# Patient Record
Sex: FEMALE | URBAN - METROPOLITAN AREA
[De-identification: names, ages, dates, MRNs, and addresses within clinical notes are randomized per-mention and may not be internally consistent; named-entity substitution may affect disease eponyms.]

---

## 2018-02-05 ENCOUNTER — OFFICE VISIT (OUTPATIENT)
Dept: INTERNAL MEDICINE CLINIC | Age: 33
End: 2018-02-05

## 2018-02-05 VITALS
SYSTOLIC BLOOD PRESSURE: 101 MMHG | WEIGHT: 142 LBS | HEART RATE: 66 BPM | DIASTOLIC BLOOD PRESSURE: 63 MMHG | TEMPERATURE: 97.9 F | OXYGEN SATURATION: 99 % | HEIGHT: 65 IN | RESPIRATION RATE: 16 BRPM | BODY MASS INDEX: 23.66 KG/M2

## 2018-02-05 DIAGNOSIS — H81.10 BENIGN PAROXYSMAL POSITIONAL VERTIGO, UNSPECIFIED LATERALITY: Primary | ICD-10-CM

## 2018-02-05 RX ORDER — ONDANSETRON 8 MG/1
8 TABLET, ORALLY DISINTEGRATING ORAL
Qty: 20 TAB | Refills: 0 | Status: SHIPPED | OUTPATIENT
Start: 2018-02-05 | End: 2018-11-12 | Stop reason: ALTCHOICE

## 2018-02-05 RX ORDER — MECLIZINE HYDROCHLORIDE 25 MG/1
25 TABLET ORAL
Qty: 30 TAB | Refills: 0 | Status: SHIPPED | OUTPATIENT
Start: 2018-02-05 | End: 2018-02-15

## 2018-02-05 NOTE — PROGRESS NOTES
Chief Complaint   Patient presents with    Vomiting     all this AM- 4 or five times     Nausea     pt is 27 wks- Dr. Naty Leslie VPFW     1. Have you been to the ER, urgent care clinic since your last visit? Hospitalized since your last visit? No    2. Have you seen or consulted any other health care providers outside of the [address] [place] [address] since your last visit? Include any pap smears or colon screening.  No

## 2018-02-05 NOTE — PATIENT INSTRUCTIONS
Vertigo: Care Instructions  Your Care Instructions    Vertigo is the feeling that you or your surroundings are moving when there is no actual movement. It is often described as a feeling of spinning, whirling, falling, or tilting. Vertigo may make you vomit or feel nauseated. You may have trouble standing or walking and may lose your balance. Vertigo is often related to an inner ear problem, but it can have other more serious causes. If vertigo continues, you may need more tests to find its cause. Follow-up care is a key part of your treatment and safety. Be sure to make and go to all appointments, and call your doctor if you are having problems. It's also a good idea to know your test results and keep a list of the medicines you take. How can you care for yourself at home? · Do not lie flat on your back. Prop yourself up slightly. This may reduce the spinning feeling. Keep your eyes open. · Move slowly so that you do not fall. · If your doctor recommends medicine, take it exactly as directed. · Do not drive while you are having vertigo. Certain exercises, called Tucker-Daroff exercises, can help decrease vertigo. To do Tucker-Daroff exercises:  · Sit on the edge of a bed or sofa and quickly lie down on the side that causes the worst vertigo. Lie on your side with your ear down. · Stay in this position for at least 30 seconds or until the vertigo goes away. · Sit up. If this causes vertigo, wait for it to stop. · Repeat the procedure on the other side. · Repeat this 10 times. Do these exercises 2 times a day until the vertigo is gone. When should you call for help? Call 911 anytime you think you may need emergency care. For example, call if:  ? · You passed out (lost consciousness). ? · You have symptoms of a stroke. These may include:  ¨ Sudden numbness, tingling, weakness, or loss of movement in your face, arm, or leg, especially on only one side of your body.   ¨ Sudden vision changes. ¨ Sudden trouble speaking. ¨ Sudden confusion or trouble understanding simple statements. ¨ Sudden problems with walking or balance. ¨ A sudden, severe headache that is different from past headaches. ?Call your doctor now or seek immediate medical care if:  ? · Vertigo occurs with a fever, a headache, or ringing in your ears. ? · You have new or increased nausea and vomiting. ? Watch closely for changes in your health, and be sure to contact your doctor if:  ? · Vertigo gets worse or happens more often. ? · Vertigo has not gotten better after 2 weeks. Where can you learn more? Go to http://jeremy-heriberto.info/. Enter V229 in the search box to learn more about \"Vertigo: Care Instructions. \"  Current as of: May 12, 2017  Content Version: 11.4  © 7366-7876 [employer]. Care instructions adapted under license by [employer] (which disclaims liability or warranty for this information). If you have questions about a medical condition or this instruction, always ask your healthcare professional. [place] [address] any warranty or liability for your use of this information. Vertigo: Exercises  Your Care Instructions  Here are some examples of typical rehabilitation exercises for your condition. Start each exercise slowly. Ease off the exercise if you start to have pain. Your doctor or physical therapist will tell you when you can start these exercises and which ones will work best for you. How to do the exercises  Exercise 1    1. Stand with a chair in front of you and a wall behind you. If you begin to fall, you may use them for support. 2. Stand with your feet together and your arms at your sides. 3. Move your head up and down 10 times. Exercise 2    1. Move your head side to side 10 times. Exercise 3    1. Move your head diagonally up and down 10 times. Exercise 4    1.  Move your head diagonally up and down 10 times on the other side.  Follow-up care is a key part of your treatment and safety. Be sure to make and go to all appointments, and call your doctor if you are having problems. It's also a good idea to know your test results and keep a list of the medicines you take. Where can you learn more? Go to http://jeremy-heriberto.info/. Enter F349 in the search box to learn more about \"Vertigo: Exercises. \"  Current as of: May 4, 2017  Content Version: 11.4  © 6868-5799 [employer]. Care instructions adapted under license by [employer] (which disclaims liability or warranty for this information). If you have questions about a medical condition or this instruction, always ask your healthcare professional. [address] any warranty or liability for your use of this information. Meclizine up to 3 times a day for dizziness (may need to purchase over the counter). Ondansetron for vomiting.

## 2018-02-05 NOTE — PROGRESS NOTES
HISTORY OF PRESENT ILLNESS  Cassie Gaston is a 34 y.o. female. HPI  YESTERDAY HAD allergy attack when sneezed all am long. This am nose was congested but otherwise feeling ok. Went to work and started to feel dizzy. Vomitted 4-5 times this am.  No diarrhea or abdominal cramping. Had episode of vertigo which was similar several years ago. Dizzy when opens eyes and moves. Hearing is normal.  No fevers or chills. 27 weeks pregnant. In office bs was slightly elevated. Completed 3 hours GTT on Friday, she does not know the results. Current Outpatient Prescriptions:     multivitamin (ONE A DAY) tablet, Take 1 Tab by mouth daily. prenatal, Disp: , Rfl:     Visit Vitals    /63 (BP 1 Location: Left arm, BP Patient Position: Sitting)    Pulse 66    Temp 97.9 °F (36.6 °C) (Oral)    Resp 16    Ht 5' 4.5\" (1.638 m)    Wt 142 lb (64.4 kg)    SpO2 99%    BMI 24 kg/m2       ROS  See above  Physical Exam   Constitutional: She appears well-developed and well-nourished. HENT:   Head: Normocephalic and atraumatic. Right Ear: External ear normal.   Left Ear: External ear normal.   Nose: Nose normal.   Mouth/Throat: Oropharynx is clear and moist.   Neck: Neck supple. No thyromegaly present. Cardiovascular: Normal rate, regular rhythm and normal heart sounds. Exam reveals no gallop and no friction rub. No murmur heard. Pulmonary/Chest: Effort normal and breath sounds normal.   Lymphadenopathy:     She has no cervical adenopathy. Vitals reviewed. ASSESSMENT and PLAN  Vertigo - discussed treatment. Meclizine prn.  odansetron prn. Vertigo exercises. Orders Placed This Encounter    ondansetron (ZOFRAN ODT) 8 mg disintegrating tablet    meclizine (ANTIVERT) 25 mg tablet     Follow-up Disposition:  Return if symptoms worsen or fail to improve.

## 2018-02-05 NOTE — MR AVS SNAPSHOT
[address] [place] [address] [address] [place] [address] 
[phone] Patient: Felicia Sifuentes MRN: [id number] KLP:6/36/1949 Visit Information Date & Time Provider Department Dept. Phone Encounter #  
 2/5/2018 12:20 Lynn Feliciano MD [facility] [phone] [id number] Follow-up Instructions Return if symptoms worsen or fail to improve. Upcoming Health Maintenance Date Due DTaP/Tdap/Td series (1 - Tdap) 4/14/2005 PAP AKA CERVICAL CYTOLOGY 4/14/2005 Influenza Age 5 to Adult 8/1/2017 Allergies as of 2/5/2018  Review Complete On: 2/5/2018 By: Kee Muir MD  
 No Known Allergies Current Immunizations  Reviewed on 10/17/2016 Name Date Influenza Vaccine 9/24/2016 Not reviewed this visit You Were Diagnosed With   
  
 Codes Comments Benign paroxysmal positional vertigo, unspecified laterality    -  Primary ICD-10-CM: H81.10 ICD-9-CM: 386.11 Vitals BP Pulse Temp Resp Height(growth percentile) Weight(growth percentile) 101/63 (BP 1 Location: Left arm, BP Patient Position: Sitting) 66 97.9 °F (36.6 °C) (Oral) 16 5' 4.5\" (1.638 m) 142 lb (64.4 kg) SpO2 BMI OB Status Smoking Status 99% 24 kg/m2 Pregnant Never Smoker BMI and BSA Data Body Mass Index Body Surface Area  
 24 kg/m 2 1.71 m 2 Preferred Pharmacy Pharmacy Name Phone [id number] [address] [phone] Your Updated Medication List  
  
   
This list is accurate as of: 2/5/18  1:22 PM.  Always use your most recent med list.  
  
  
  
  
 meclizine 25 mg tablet Commonly known as:  ANTIVERT Take 1 Tab by mouth three (3) times daily as needed for up to 10 days. For vertigo  
  
 multivitamin tablet Commonly known as:  ONE A DAY Take 1 Tab by mouth daily. prenatal  
  
 ondansetron 8 mg disintegrating tablet Commonly known as:  ZOFRAN ODT Take 1 Tab by mouth every eight (8) hours as needed for Nausea. And vomiting Prescriptions Sent to Pharmacy Refills  
 ondansetron (ZOFRAN ODT) 8 mg disintegrating tablet 0 Sig: Take 1 Tab by mouth every eight (8) hours as needed for Nausea. And vomiting Class: Normal  
 Pharmacy: [facility] #[id number] Ph #: [phone] Route: Oral  
 meclizine (ANTIVERT) 25 mg tablet 0 Sig: Take 1 Tab by mouth three (3) times daily as needed for up to 10 days. For vertigo Class: Normal  
 Pharmacy: [facility]/pharmacy #[id number] Ph #: [phone] Route: Oral  
  
Follow-up Instructions Return if symptoms worsen or fail to improve. Patient Instructions Vertigo: Care Instructions Your Care Instructions Vertigo is the feeling that you or your surroundings are moving when there is no actual movement. It is often described as a feeling of spinning, whirling, falling, or tilting. Vertigo may make you vomit or feel nauseated. You may have trouble standing or walking and may lose your balance. Vertigo is often related to an inner ear problem, but it can have other more serious causes. If vertigo continues, you may need more tests to find its cause. Follow-up care is a key part of your treatment and safety. Be sure to make and go to all appointments, and call your doctor if you are having problems. It's also a good idea to know your test results and keep a list of the medicines you take. How can you care for yourself at home? · Do not lie flat on your back. Prop yourself up slightly. This may reduce the spinning feeling. Keep your eyes open. · Move slowly so that you do not fall. · If your doctor recommends medicine, take it exactly as directed. · Do not drive while you are having vertigo.  
Certain exercises, called Tucker-Daroff exercises, can help decrease vertigo. To do Tucker-Daroff exercises: · Sit on the edge of a bed or sofa and quickly lie down on the side that causes the worst vertigo. Lie on your side with your ear down. · Stay in this position for at least 30 seconds or until the vertigo goes away. · Sit up. If this causes vertigo, wait for it to stop. · Repeat the procedure on the other side. · Repeat this 10 times. Do these exercises 2 times a day until the vertigo is gone. When should you call for help? Call 911 anytime you think you may need emergency care. For example, call if: 
? · You passed out (lost consciousness). ? · You have symptoms of a stroke. These may include: 
¨ Sudden numbness, tingling, weakness, or loss of movement in your face, arm, or leg, especially on only one side of your body. ¨ Sudden vision changes. ¨ Sudden trouble speaking. ¨ Sudden confusion or trouble understanding simple statements. ¨ Sudden problems with walking or balance. ¨ A sudden, severe headache that is different from past headaches. ?Call your doctor now or seek immediate medical care if: 
? · Vertigo occurs with a fever, a headache, or ringing in your ears. ? · You have new or increased nausea and vomiting. ? Watch closely for changes in your health, and be sure to contact your doctor if: 
? · Vertigo gets worse or happens more often. ? · Vertigo has not gotten better after 2 weeks. Where can you learn more? Go to http://jeremy-heriberto.info/. Enter K680 in the search box to learn more about \"Vertigo: Care Instructions. \" Current as of: May 12, 2017 Content Version: 11.4 © 8718-8169 [employer]. Care instructions adapted under license by [employer] (which disclaims liability or warranty for this information).  If you have questions about a medical condition or this instruction, always ask your healthcare professional. [address] any warranty or liability for your use of this information. Vertigo: Exercises Your Care Instructions Here are some examples of typical rehabilitation exercises for your condition. Start each exercise slowly. Ease off the exercise if you start to have pain. Your doctor or physical therapist will tell you when you can start these exercises and which ones will work best for you. How to do the exercises Exercise 1 1. Stand with a chair in front of you and a wall behind you. If you begin to fall, you may use them for support. 2. Stand with your feet together and your arms at your sides. 3. Move your head up and down 10 times. Exercise 2 1. Move your head side to side 10 times. Exercise 3 1. Move your head diagonally up and down 10 times. Exercise 4 1. Move your head diagonally up and down 10 times on the other side. Follow-up care is a key part of your treatment and safety. Be sure to make and go to all appointments, and call your doctor if you are having problems. It's also a good idea to know your test results and keep a list of the medicines you take. Where can you learn more? Go to http://jeremy-heriberto.info/. Enter F349 in the search box to learn more about \"Vertigo: Exercises. \" Current as of: May 4, 2017 Content Version: 11.4 © 4361-1167 [employer]. Care instructions adapted under license by [employer] (which disclaims liability or warranty for this information). If you have questions about a medical condition or this instruction, always ask your healthcare professional. [place] [address] any warranty or liability for your use of this information. Meclizine up to 3 times a day for dizziness (may need to purchase over the counter). Ondansetron for vomiting. Introducing [facility]! Dear Donavan Males: Thank you for requesting a [employer] account. Our records indicate that you already have an active [employer] account.   You can access your account anytime at [email or web address] Did you know that you can access your hospital and ER discharge instructions at any time in [employer]? You can also review all of your test results from your hospital stay or ER visit. Additional Information If you have questions, please visit the Frequently Asked Questions section of the [employer] website at [email or web address] Remember, [employer] is NOT to be used for urgent needs. For medical emergencies, dial 911. Now available from your iPhone and Android! Please provide this summary of care documentation to your next provider. Your primary care clinician is listed as RICO LOAIZA. If you have any questions after today's visit, please call [phone].

## 2018-11-12 ENCOUNTER — OFFICE VISIT (OUTPATIENT)
Dept: INTERNAL MEDICINE CLINIC | Age: 33
End: 2018-11-12

## 2018-11-12 VITALS
OXYGEN SATURATION: 99 % | TEMPERATURE: 98.2 F | WEIGHT: 127 LBS | RESPIRATION RATE: 18 BRPM | DIASTOLIC BLOOD PRESSURE: 60 MMHG | BODY MASS INDEX: 21.16 KG/M2 | HEART RATE: 54 BPM | HEIGHT: 65 IN | SYSTOLIC BLOOD PRESSURE: 115 MMHG

## 2018-11-12 DIAGNOSIS — Z00.00 ROUTINE GENERAL MEDICAL EXAMINATION AT A HEALTH CARE FACILITY: Primary | ICD-10-CM

## 2018-11-12 DIAGNOSIS — Z23 ENCOUNTER FOR IMMUNIZATION: ICD-10-CM

## 2018-11-12 NOTE — PROGRESS NOTES
Subjective:  
28 y.o. female for Well Woman Check. Her gyne and breast care is done elsewhere by her Ob-Gyne physician. UTD with Baldwin Schirmer, MD for gyn care in July. There are no active problems to display for this patient. Current Outpatient Medications Medication Sig Dispense Refill  [id number] maranda-folic-dha (PRENATAL DHA+COMPLETE PRENATAL) -300 mg-mcg-mg cmpk Take  by mouth. No Known Allergies History reviewed. No pertinent past medical history. History reviewed. No pertinent surgical history. Family History Problem Relation Age of Onset  Hypertension Mother  Cancer Father   
     Eunice Kwok Social History Tobacco Use  Smoking status: Never Smoker  Smokeless tobacco: Never Used Substance Use Topics  Alcohol use: No  
  
 
 
Specific concerns today: none. 11 month old daughter (first). Continues to breast feed. Induced at 38 weeks. Hasn't lost all of her baby weight but \"I was pretty skinny prior to getting pregnant\" Review of Systems Constitutional: negative Eyes: increased blurred vision since pregnant. wearing glasses more regularly. due to eye exam.   
Ears, nose, mouth, throat, and face: negative Respiratory: negative Cardiovascular: negative Gastrointestinal: negative except for reflux resolved post delivery Genitourinary:negative Integument/breast: negative Hematologic/lymphatic: negative Musculoskeletal:negative except for mild knee stiffness Neurological: negative Behavioral/Psych: negative Endocrine: negative Allergic/Immunologic: negative Objective:  
Blood pressure 115/60, pulse (!) 54, temperature 98.2 °F (36.8 °C), temperature source Oral, resp. rate 18, height 5' 4.5\" (1.638 m), weight 127 lb (57.6 kg), SpO2 99 %, unknown if currently breastfeeding. Physical Examination:  
General appearance - alert, well appearing, and in no distress and oriented to person, place, and time Mental status - alert, oriented to person, place, and time, normal mood, behavior, speech, dress, motor activity, and thought processes Eyes - pupils equal and reactive, extraocular eye movements intact Ears - bilateral TM's and external ear canals normal 
Nose - normal and patent, no erythema, discharge or polyps Mouth - mucous membranes moist, pharynx normal without lesions Neck - supple, no significant adenopathy, carotids upstroke normal bilaterally, no bruits, thyroid exam: thyroid is normal in size without nodules or tenderness Lymphatics - no palpable lymphadenopathy, no hepatosplenomegaly Chest - clear to auscultation, no wheezes, rales or rhonchi, symmetric air entry Heart - normal rate, regular rhythm, normal S1, S2, no murmurs, rubs, clicks or gallops Abdomen - soft, nontender, nondistended, no masses or organomegaly 
bowel sounds normal 
Breasts - breasts appear normal, no suspicious masses, no skin or nipple changes or axillary nodes Back exam - full range of motion, no tenderness, palpable spasm or pain on motion Neurological - alert, oriented, normal speech, no focal findings or movement disorder noted Musculoskeletal - no joint tenderness, deformity or swelling Extremities - peripheral pulses normal, no pedal edema, no clubbing or cyanosis Skin - normal coloration and turgor, no rashes, no suspicious skin lesions noted Assessment/Plan:  
Healthy 34yo Discussed regular exercise No labs indicated at this time Flu shot administered at visit. call if any problems Orders Placed This Encounter  MT IMMUNIZ ADMIN,1 SINGLE/COMB VAC/TOXOID  
 INFLUENZA VIRUS VACCINE QUADRIVALENT, PRESERVATIVE FREE SYRINGE (06566)  [id number] maranda-folic-dha (PRENATAL DHA+COMPLETE PRENATAL) [id number] mg-mcg-mg cmpk Follow-up Disposition: Not on File

## 2018-11-12 NOTE — PROGRESS NOTES
All health maintenance and other pertinent information has been reviewed in preparation for today's office visit. Patient presents in the office today for: Chief Complaint Patient presents with  Complete Physical  
  Not fasting 1. Have you been to the ER, urgent care clinic since your last visit? Hospitalized since your last visit? No 
 
2. Have you seen or consulted any other health care providers outside of the [facility] since your last visit? Include any pap smears or colon screening. No 
 
 
Per provider order,  FLUARIX QUADRIVALENT INFLUENZA VACCINE, IM (0.5) mL was administered to the patient in the left deltoid via IM route. Patient tolerated vaccine/injection well. Patient waited for 20 minutes post injection for observation. No adverse reaction noted. All documentation completed.

## 2019-10-02 ENCOUNTER — OFFICE VISIT (OUTPATIENT)
Dept: INTERNAL MEDICINE CLINIC | Age: 34
End: 2019-10-02

## 2019-10-02 VITALS
HEIGHT: 65 IN | WEIGHT: 122 LBS | OXYGEN SATURATION: 99 % | DIASTOLIC BLOOD PRESSURE: 71 MMHG | BODY MASS INDEX: 20.33 KG/M2 | SYSTOLIC BLOOD PRESSURE: 117 MMHG | HEART RATE: 49 BPM | TEMPERATURE: 98.5 F

## 2019-10-02 DIAGNOSIS — Z23 ENCOUNTER FOR IMMUNIZATION: ICD-10-CM

## 2019-10-02 DIAGNOSIS — E55.9 VITAMIN D DEFICIENCY: ICD-10-CM

## 2019-10-02 DIAGNOSIS — Z00.00 ROUTINE GENERAL MEDICAL EXAMINATION AT A HEALTH CARE FACILITY: Primary | ICD-10-CM

## 2019-10-02 NOTE — PATIENT INSTRUCTIONS
Leonardo [place] Disease: Exercises  Introduction  Here are some examples of exercises for you to try. The exercises may be suggested for a condition or for rehabilitation. Start each exercise slowly. Ease off the exercises if you start to have pain. You will be told when to start these exercises and which ones will work best for you. How to do the exercises  Thumb lifts    1. Place your hand on a flat surface, with your palm up. 2. Lift your thumb away from your palm to make a \"C\" shape. 3. Hold for about 6 seconds. 4. Repeat 8 to 12 times. Passive thumb MP flexion    1. Hold your hand in front of you, and turn your hand so your little finger faces down and your thumb faces up. (Your hand should be in the position used for shaking someone's hand.) You may also rest your hand on a flat surface. 2. Use the fingers on your other hand to bend your thumb down at the point where your thumb connects to your palm. 3. Hold for at least 15 to 30 seconds. 4. Repeat 2 to 4 times. Finkelstein stretch    1. Hold your arms out in front of you. (Your hand should be in the position used for shaking someone's hand.)  2. Bend your thumb toward your palm. 3. Use your other hand to gently stretch your thumb and wrist downward until you feel the stretch on the thumb side of your wrist.  4. Hold for at least 15 to 30 seconds. 5. Repeat 2 to 4 times. Resisted ulnar deviation    1. Sit leaning forward with your legs slightly spread and your elbow on your thigh. 2. Grasp one end of the band with your palm down, and step on the other end with the foot opposite the hand holding the band. 3. Slowly bend your wrist sideways and away from your knee. 4. Repeat 8 to 12 times. Follow-up care is a key part of your treatment and safety. Be sure to make and go to all appointments, and call your doctor if you are having problems. It's also a good idea to know your test results and keep a list of the medicines you take.   Where can you learn more? Go to http://jeremy-heriberto.info/. Enter W042 in the search box to learn more about \"De Quervain's Disease: Exercises. \"  Current as of: June 26, 2019  Content Version: 12.2  © 2665-5160 [employer]. Care instructions adapted under license by [employer] (which disclaims liability or warranty for this information). If you have questions about a medical condition or this instruction, always ask your healthcare professional. [place] [address] any warranty or liability for your use of this information. Vaccine Information Statement    Influenza (Flu) Vaccine (Inactivated or Recombinant): What You Need to Know    Many Vaccine Information Statements are available in Azeri and other languages. See www.immunize.org/vis  Hojas de información sobre vacunas están disponibles en español y en muchos otros idiomas. Visite www.immunize.org/vis    1. Why get vaccinated? Influenza vaccine can prevent influenza (flu). Flu is a contagious disease that spreads around the [place] every year, usually between October and May. Anyone can get the flu, but it is more dangerous for some people. Infants and young children, people 72years of age and older, pregnant women, and people with certain health conditions or a weakened immune system are at greatest risk of flu complications. Pneumonia, bronchitis, sinus infections and ear infections are examples of flu-related complications. If you have a medical condition, such as heart disease, cancer or diabetes, flu can make it worse. Flu can cause fever and chills, sore throat, muscle aches, fatigue, cough, headache, and runny or stuffy nose. Some people may have vomiting and diarrhea, though this is more common in children than adults. Each year thousands of people in the [facility] die from flu, and many more are hospitalized.  Flu vaccine prevents millions of illnesses and flu-related visits to the doctor each year. 2. Influenza vaccines     CDC recommends everyone 10months of age and older get vaccinated every flu season. Children 6 months through 6years of age may need 2 doses during a single flu season. Everyone else needs only 1 dose each flu season. It takes about 2 weeks for protection to develop after vaccination. There are many flu viruses, and they are always changing. Each year a new flu vaccine is made to protect against three or four viruses that are likely to cause disease in the upcoming flu season. Even when the vaccine doesnt exactly match these viruses, it may still provide some protection. Influenza vaccine does not cause flu. Influenza vaccine may be given at the same time as other vaccines. 3. Talk with your health care provider    Tell your vaccine provider if the person getting the vaccine:   Has had an allergic reaction after a previous dose of influenza vaccine, or has any severe, life-threatening allergies.  Has ever had Guillain-Barré Syndrome (also called GBS). In some cases, your health care provider may decide to postpone influenza vaccination to a future visit. People with minor illnesses, such as a cold, may be vaccinated. People who are moderately or severely ill should usually wait until they recover before getting influenza vaccine. Your health care provider can give you more information. 4. Risks of a reaction     Soreness, redness, and swelling where shot is given, fever, muscle aches, and headache can happen after influenza vaccine.  There may be a very small increased risk of Guillain-Barré Syndrome (GBS) after inactivated influenza vaccine (the flu shot). [facility] children who get the flu shot along with pneumococcal vaccine (PCV13), and/or DTaP vaccine at the same time might be slightly more likely to have a seizure caused by fever.  Tell your health care provider if a child who is getting flu vaccine has ever had a seizure. People sometimes faint after medical procedures, including vaccination. Tell your provider if you feel dizzy or have vision changes or ringing in the ears. As with any medicine, there is a very remote chance of a vaccine causing a severe allergic reaction, other serious injury, or death. 5. What if there is a serious problem? An allergic reaction could occur after the vaccinated person leaves the clinic. If you see signs of a severe allergic reaction (hives, swelling of the face and throat, difficulty breathing, a fast heartbeat, dizziness, or weakness), call 9-1-1 and get the person to the nearest hospital.    For other signs that concern you, call your health care provider. Adverse reactions should be reported to the Vaccine Adverse Event Reporting System (VAERS). Your health care provider will usually file this report, or you can do it yourself. Visit the VAERS website at www.vaers. hhs.gov or call 7-767.743.8039. VAERS is only for reporting reactions, and VAERS staff do not give medical advice. 6. The National Vaccine Injury Compensation Program    The [facility] Vaccine Injury Compensation Program (VICP) is a federal program that was created to compensate people who may have been injured by certain vaccines. Visit the VICP website at www.hrsa.gov/vaccinecompensation or call 9-528.864.9612 to learn about the program and about filing a claim. There is a time limit to file a claim for compensation. 7. How can I learn more?  Ask your health care provider.  Call your local or state health department.  Contact the Centers for Disease Control and Prevention (CDC):  - Call 4-301.260.1244 (1-800-CDC-INFO) or  - Visit CDCs influenza website at www.cdc.gov/flu    Vaccine Information Statement (Interim)  Inactivated Influenza Vaccine   8/15/2019  42 ANAT [place] [id number]   Department of Health and Human Services  Centers for Disease Control and Prevention    Office Use Only

## 2019-10-02 NOTE — PROGRESS NOTES
Chief Complaint   Patient presents with    Follow-up     Visit Vitals  /71   Pulse (!) 49   Temp 98.5 °F (36.9 °C) (Oral)   Ht 5' 4.5\" (1.638 m)   Wt 122 lb (55.3 kg)   SpO2 99%   BMI 20.62 kg/m²       1. Have you been to the ER, urgent care clinic since your last visit? Hospitalized since your last visit? Yes [facility]    2. Have you seen or consulted any other health care providers outside of the [address] [place] [address] since your last visit? Include any pap smears or colon screening.   no

## 2019-10-02 NOTE — PROGRESS NOTES
Subjective:   27 y.o. female for Well Woman Check. Her gyne and breast care is done elsewhere by her Ob-Gyne physician. UTD with Dr. Katelyn Mendez in July. Nml pap    There are no active problems to display for this patient. Current Outpatient Medications   Medication Sig Dispense Refill    ascorbic acid (VITAMIN C PO) Take  by mouth. No Known Allergies  History reviewed. No pertinent past medical history. History reviewed. No pertinent surgical history. Family History   Problem Relation Age of Onset    Hypertension Mother     Cancer Father         leukema     Social History     Tobacco Use    Smoking status: Never Smoker    Smokeless tobacco: Never Used   Substance Use Topics    Alcohol use: No         Specific concerns today:   Itching in groin post shaving. Using benedryl cream.  Red secondary to itching but no underlying rash. .    Review of Systems  Constitutional: negative  Eyes: negative  Ears, nose, mouth, throat, and face: negative  Respiratory: negative  Cardiovascular: negative  Gastrointestinal: negative  Genitourinary:negative  Integument/breast: negative  Hematologic/lymphatic: negative  Musculoskeletal:negative except for right wrist pain. Neurological: negative except for intermittent issues with sleep - tends to occur when stressed about work. Behavioral/Psych: negative  Endocrine: negative  Allergic/Immunologic: negative    Objective:   Blood pressure 117/71, pulse (!) 49, temperature 98.5 °F (36.9 °C), temperature source Oral, height 5' 4.5\" (1.638 m), weight 122 lb (55.3 kg), SpO2 99 %, unknown if currently breastfeeding.    Physical Examination:   General appearance - alert, well appearing, and in no distress and oriented to person, place, and time  Mental status - alert, oriented to person, place, and time, normal mood, behavior, speech, dress, motor activity, and thought processes  Eyes - pupils equal and reactive, extraocular eye movements intact  Ears - bilateral TM's and external ear canals normal  Nose - normal and patent, no erythema, discharge or polyps  Mouth - mucous membranes moist, pharynx normal without lesions  Neck - supple, no significant adenopathy, carotids upstroke normal bilaterally, no bruits, thyroid exam: thyroid is normal in size without nodules or tenderness  Lymphatics - no palpable lymphadenopathy, no hepatosplenomegaly  Chest - clear to auscultation, no wheezes, rales or rhonchi, symmetric air entry  Heart - normal rate, regular rhythm, normal S1, S2, no murmurs, rubs, clicks or gallops  Abdomen - soft, nontender, nondistended, no masses or organomegaly  bowel sounds normal  Back exam - full range of motion, no tenderness, palpable spasm or pain on motion  Neurological - alert, oriented, normal speech, no focal findings or movement disorder noted  Musculoskeletal - no joint tenderness, deformity or swelling EXCEPT tenderness right wrist at anatomical snuff box. Full rom. Extremities - peripheral pulses normal, no pedal edema, no clubbing or cyanosis  Skin - normal coloration and turgor, no rashes, no suspicious skin lesions noted EXCEPT mild erythema - evidence of excoriation left bikini line. Assessment/Plan:   30yo female  Healthy  Vit d def - repeat levels  Irritation from shaving - discussed changing razors, chaving with the grain, not against, hair removal products instead of shaving. DeQuervains tenosynovitis right wrist - stretching exercises.  _ check labs, flu shot admin today.     call if any problems  Orders Placed This Encounter    INFLUENZA VIRUS VACCINE QUADRIVALENT, PRESERVATIVE FREE SYRINGE (67888)    LIPID PANEL    METABOLIC PANEL, COMPREHENSIVE    CBC W/O DIFF    T4, FREE    VITAMIN D, 25 HYDROXY    ascorbic acid (VITAMIN C PO)

## 2019-10-03 LAB
25(OH)D3+25(OH)D2 SERPL-MCNC: 34.5 NG/ML (ref 30–100)
ALBUMIN SERPL-MCNC: 4.7 G/DL (ref 3.5–5.5)
ALBUMIN/GLOB SERPL: 1.7 {RATIO} (ref 1.2–2.2)
ALP SERPL-CCNC: 47 IU/L (ref 39–117)
ALT SERPL-CCNC: 12 IU/L (ref 0–32)
AST SERPL-CCNC: 14 IU/L (ref 0–40)
BILIRUB SERPL-MCNC: 0.3 MG/DL (ref 0–1.2)
BUN SERPL-MCNC: 14 MG/DL (ref 6–20)
BUN/CREAT SERPL: 21 (ref 9–23)
CALCIUM SERPL-MCNC: 10.1 MG/DL (ref 8.7–10.2)
CHLORIDE SERPL-SCNC: 102 MMOL/L (ref 96–106)
CHOLEST SERPL-MCNC: 176 MG/DL (ref 100–199)
CO2 SERPL-SCNC: 27 MMOL/L (ref 20–29)
CREAT SERPL-MCNC: 0.67 MG/DL (ref 0.57–1)
ERYTHROCYTE [DISTWIDTH] IN BLOOD BY AUTOMATED COUNT: 12.6 % (ref 12.3–15.4)
GLOBULIN SER CALC-MCNC: 2.8 G/DL (ref 1.5–4.5)
GLUCOSE SERPL-MCNC: 85 MG/DL (ref 65–99)
HCT VFR BLD AUTO: 38.8 % (ref 34–46.6)
HDLC SERPL-MCNC: 71 MG/DL
HGB BLD-MCNC: 13.6 G/DL (ref 11.1–15.9)
INTERPRETATION, 910389: NORMAL
LDLC SERPL CALC-MCNC: 94 MG/DL (ref 0–99)
MCH RBC QN AUTO: 30.7 PG (ref 26.6–33)
MCHC RBC AUTO-ENTMCNC: 35.1 G/DL (ref 31.5–35.7)
MCV RBC AUTO: 88 FL (ref 79–97)
PLATELET # BLD AUTO: 295 X10E3/UL (ref 150–450)
POTASSIUM SERPL-SCNC: 4.6 MMOL/L (ref 3.5–5.2)
PROT SERPL-MCNC: 7.5 G/DL (ref 6–8.5)
RBC # BLD AUTO: 4.43 X10E6/UL (ref 3.77–5.28)
SODIUM SERPL-SCNC: 142 MMOL/L (ref 134–144)
T4 FREE SERPL-MCNC: 1.51 NG/DL (ref 0.82–1.77)
TRIGL SERPL-MCNC: 55 MG/DL (ref 0–149)
VLDLC SERPL CALC-MCNC: 11 MG/DL (ref 5–40)
WBC # BLD AUTO: 3.6 X10E3/UL (ref 3.4–10.8)

## 2021-01-13 ENCOUNTER — OFFICE VISIT (OUTPATIENT)
Dept: INTERNAL MEDICINE CLINIC | Age: 36
End: 2021-01-13
Payer: COMMERCIAL

## 2021-01-13 VITALS
HEART RATE: 70 BPM | BODY MASS INDEX: 20.79 KG/M2 | DIASTOLIC BLOOD PRESSURE: 75 MMHG | HEIGHT: 65 IN | TEMPERATURE: 97.2 F | RESPIRATION RATE: 16 BRPM | SYSTOLIC BLOOD PRESSURE: 134 MMHG | WEIGHT: 124.8 LBS

## 2021-01-13 DIAGNOSIS — E55.9 VITAMIN D DEFICIENCY: ICD-10-CM

## 2021-01-13 DIAGNOSIS — Z00.00 ROUTINE GENERAL MEDICAL EXAMINATION AT A HEALTH CARE FACILITY: Primary | ICD-10-CM

## 2021-01-13 PROCEDURE — 99395 PREV VISIT EST AGE 18-39: CPT | Performed by: INTERNAL MEDICINE

## 2021-01-13 NOTE — PROGRESS NOTES
Chief Complaint   Patient presents with    Complete Physical         1. Have you been to the ER, urgent care clinic since your last visit? Hospitalized since your last visit? No    2. Have you seen or consulted any other health care providers outside of the [address] [place] [address] since your last visit? Include any pap smears or colon screening.  No

## 2021-01-13 NOTE — PROGRESS NOTES
Subjective:   38 y.o. female for Well Woman Check. Her gyne and breast care is done elsewhere by her Ob-Gyne physician. There are no active problems to display for this patient. Current Outpatient Medications   Medication Sig Dispense Refill    ascorbic acid (VITAMIN C PO) Take  by mouth. No Known Allergies  History reviewed. No pertinent past medical history. History reviewed. No pertinent surgical history. Family History   Problem Relation Age of Onset    Hypertension Mother     Cancer Father         leukema     Social History     Tobacco Use    Smoking status: Never Smoker    Smokeless tobacco: Never Used   Substance Use Topics    Alcohol use: No        Lab Results   Component Value Date/Time    WBC 3.6 10/02/2019 11:15 AM    HGB 13.6 10/02/2019 11:15 AM    HCT 38.8 10/02/2019 11:15 AM    PLATELET 994 65/07/5566 11:15 AM    MCV 88 10/02/2019 11:15 AM     Lab Results   Component Value Date/Time    Glucose 85 10/02/2019 11:15 AM    LDL, calculated 94 10/02/2019 11:15 AM    Creatinine 0.67 10/02/2019 11:15 AM      Lab Results   Component Value Date/Time    Cholesterol, total 176 10/02/2019 11:15 AM    HDL Cholesterol 71 10/02/2019 11:15 AM    LDL, calculated 94 10/02/2019 11:15 AM    Triglyceride 55 10/02/2019 11:15 AM     Lab Results   Component Value Date/Time    ALT (SGPT) 12 10/02/2019 11:15 AM    Alk.  phosphatase 47 10/02/2019 11:15 AM    Bilirubin, total 0.3 10/02/2019 11:15 AM    Albumin 4.7 10/02/2019 11:15 AM    Protein, total 7.5 10/02/2019 11:15 AM    PLATELET 214 07/75/5112 11:15 AM     Lab Results   Component Value Date/Time    GFR est non- 10/02/2019 11:15 AM    GFR est  10/02/2019 11:15 AM    Creatinine 0.67 10/02/2019 11:15 AM    BUN 14 10/02/2019 11:15 AM    Sodium 142 10/02/2019 11:15 AM    Potassium 4.6 10/02/2019 11:15 AM    Chloride 102 10/02/2019 11:15 AM    CO2 27 10/02/2019 11:15 AM     Lab Results   Component Value Date/Time    TSH 3.090 10/17/2016 09:36 AM T4, Free 1.51 10/02/2019 11:15 AM         Specific concerns today:    - 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 .    Review of Systems  Constitutional: negative  Eyes: negative except for contacts/glasses  Ears, nose, mouth, throat, and face: negative except for seasonal allergies  Respiratory: negative  Cardiovascular: negative  Gastrointestinal: negative  Genitourinary:negative  Integument/breast: negative  Hematologic/lymphatic: negative  Musculoskeletal:negative  Neurological: negative  Behavioral/Psych: negative  Endocrine: negative  Allergic/Immunologic: negative except for seasonal allergies    Objective:   Blood pressure 134/75, pulse 70, temperature 97.2 °F (36.2 °C), temperature source Temporal, resp. rate 16, height 5' 4.5\" (1.638 m), weight 124 lb 12.8 oz (56.6 kg), last menstrual period 12/23/2020, unknown if currently breastfeeding.    Physical Examination:   General appearance - alert, well appearing, and in no distress and oriented to person, place, and time  Mental status - alert, oriented to person, place, and time  Eyes - pupils equal and reactive, extraocular eye movements intact  Ears - bilateral TM's and external ear canals normal  Nose - normal and patent, no erythema, discharge or polyps  Mouth - mucous membranes moist, pharynx normal without lesions  Neck - supple, no significant adenopathy, carotids upstroke normal bilaterally, no bruits, thyroid exam: thyroid is normal in size without nodules or tenderness  Lymphatics - no palpable lymphadenopathy, no hepatosplenomegaly  Chest - clear to auscultation, no wheezes, rales or rhonchi, symmetric air entry  Heart - normal rate, regular rhythm, normal S1, S2, no murmurs, rubs, clicks or gallops  Abdomen - soft, nontender, nondistended, no masses or organomegaly  bowel sounds normal  Breasts - breasts appear normal, no suspicious masses, no skin or nipple changes or axillary nodes  Back exam - full range of motion, no tenderness, palpable spasm or pain on motion  Neurological - alert, oriented, normal speech, no focal findings or movement disorder noted  Musculoskeletal - no joint tenderness, deformity or swelling  Extremities - peripheral pulses normal, no pedal edema, no clubbing or cyanosis  Skin - normal coloration and turgor, no rashes, no suspicious skin lesions noted     Assessment/Plan:   43yo female here for PE  Healthy  HM - check labs. otw utd. Considering pregnancy post pandemic.     Orders Placed This Encounter    METABOLIC PANEL, COMPREHENSIVE    LIPID PANEL    CBC W/O DIFF    TSH 3RD GENERATION    VITAMIN D, 25 HYDROXY

## 2022-12-06 ENCOUNTER — TELEPHONE (OUTPATIENT)
Dept: INTERNAL MEDICINE CLINIC | Age: 37
End: 2022-12-06

## 2023-02-23 ENCOUNTER — TELEPHONE (OUTPATIENT)
Dept: INTERNAL MEDICINE CLINIC | Age: 38
End: 2023-02-23

## 2023-06-16 DIAGNOSIS — Z00.00 ROUTINE GENERAL MEDICAL EXAMINATION AT A HEALTH CARE FACILITY: ICD-10-CM

## 2024-08-15 ENCOUNTER — TELEPHONE (OUTPATIENT)
Age: 39
End: 2024-08-15

## 2024-08-15 NOTE — TELEPHONE ENCOUNTER
----- Message from Desiree DEE sent at 8/15/2024  1:56 PM EDT -----  Regarding: [facility] Appointment Request  [facility] Appointment Request    Patient needs appointment for [facility] Appointment Type: New Patient.    Patient Requested Dates(s): Wednesday, Thursday or Friday   Patient Requested Time: afternoon appointments  Provider Name: Dorinda Tate PA-C    Reason for Appointment Request: New Patient - No appointments available during search  --------------------------------------------------------------------------------------------------------------------------    Relationship to Patient: Self     Call Back Information: OK to leave message on voicemail  Preferred Call Back Number: Phone [phone]

## 2024-09-10 SDOH — HEALTH STABILITY: PHYSICAL HEALTH: ON AVERAGE, HOW MANY MINUTES DO YOU ENGAGE IN EXERCISE AT THIS LEVEL?: 30 MIN

## 2024-09-10 SDOH — HEALTH STABILITY: PHYSICAL HEALTH: ON AVERAGE, HOW MANY DAYS PER WEEK DO YOU ENGAGE IN MODERATE TO STRENUOUS EXERCISE (LIKE A BRISK WALK)?: 5 DAYS

## 2024-09-13 ENCOUNTER — OFFICE VISIT (OUTPATIENT)
Age: 39
End: 2024-09-13

## 2024-09-13 VITALS
TEMPERATURE: 98.1 F | HEART RATE: 62 BPM | SYSTOLIC BLOOD PRESSURE: 115 MMHG | HEIGHT: 64 IN | RESPIRATION RATE: 16 BRPM | DIASTOLIC BLOOD PRESSURE: 80 MMHG | OXYGEN SATURATION: 98 % | BODY MASS INDEX: 21.37 KG/M2 | WEIGHT: 125.2 LBS

## 2024-09-13 DIAGNOSIS — Z13.0 SCREENING FOR DEFICIENCY ANEMIA: ICD-10-CM

## 2024-09-13 DIAGNOSIS — Z13.228 SCREENING FOR METABOLIC DISORDER: ICD-10-CM

## 2024-09-13 DIAGNOSIS — Z13.220 SCREENING FOR HYPERLIPIDEMIA: ICD-10-CM

## 2024-09-13 DIAGNOSIS — Z13.29 SCREENING FOR THYROID DISORDER: ICD-10-CM

## 2024-09-13 DIAGNOSIS — Z13.29 SCREENING FOR THYROID DISORDER: Primary | ICD-10-CM

## 2024-09-13 SDOH — ECONOMIC STABILITY: INCOME INSECURITY: HOW HARD IS IT FOR YOU TO PAY FOR THE VERY BASICS LIKE FOOD, HOUSING, MEDICAL CARE, AND HEATING?: NOT VERY HARD

## 2024-09-13 SDOH — ECONOMIC STABILITY: FOOD INSECURITY: WITHIN THE PAST 12 MONTHS, THE FOOD YOU BOUGHT JUST DIDN'T LAST AND YOU DIDN'T HAVE MONEY TO GET MORE.: NEVER TRUE

## 2024-09-13 SDOH — ECONOMIC STABILITY: FOOD INSECURITY: WITHIN THE PAST 12 MONTHS, YOU WORRIED THAT YOUR FOOD WOULD RUN OUT BEFORE YOU GOT MONEY TO BUY MORE.: NEVER TRUE

## 2024-09-13 ASSESSMENT — ENCOUNTER SYMPTOMS
VOMITING: 0
SINUS PAIN: 0
COUGH: 0
ABDOMINAL PAIN: 0
SHORTNESS OF BREATH: 0
DIARRHEA: 0
EYE REDNESS: 0
NAUSEA: 0
WHEEZING: 0
CONSTIPATION: 0

## 2024-09-13 ASSESSMENT — PATIENT HEALTH QUESTIONNAIRE - PHQ9
1. LITTLE INTEREST OR PLEASURE IN DOING THINGS: NOT AT ALL
2. FEELING DOWN, DEPRESSED OR HOPELESS: NOT AT ALL
SUM OF ALL RESPONSES TO PHQ QUESTIONS 1-9: 0
SUM OF ALL RESPONSES TO PHQ9 QUESTIONS 1 & 2: 0
SUM OF ALL RESPONSES TO PHQ QUESTIONS 1-9: 0

## 2024-09-14 LAB
ALBUMIN SERPL-MCNC: 4.4 G/DL (ref 3.9–4.9)
ALP SERPL-CCNC: 47 IU/L (ref 44–121)
ALT SERPL-CCNC: 12 IU/L (ref 0–32)
AST SERPL-CCNC: 18 IU/L (ref 0–40)
BASOPHILS # BLD AUTO: 0.1 X10E3/UL (ref 0–0.2)
BASOPHILS NFR BLD AUTO: 1 %
BILIRUB SERPL-MCNC: 0.4 MG/DL (ref 0–1.2)
BUN SERPL-MCNC: 16 MG/DL (ref 6–24)
BUN/CREAT SERPL: 22 (ref 9–23)
CALCIUM SERPL-MCNC: 9.6 MG/DL (ref 8.7–10.2)
CHLORIDE SERPL-SCNC: 100 MMOL/L (ref 96–106)
CHOLEST SERPL-MCNC: 185 MG/DL (ref 100–199)
CO2 SERPL-SCNC: 25 MMOL/L (ref 20–29)
CREAT SERPL-MCNC: 0.72 MG/DL (ref 0.57–1)
EGFRCR SERPLBLD CKD-EPI 2021: 108 ML/MIN/1.73
EOSINOPHIL # BLD AUTO: 0.1 X10E3/UL (ref 0–0.4)
EOSINOPHIL NFR BLD AUTO: 3 %
ERYTHROCYTE [DISTWIDTH] IN BLOOD BY AUTOMATED COUNT: 12.6 % (ref 11.7–15.4)
GLOBULIN SER CALC-MCNC: 3 G/DL (ref 1.5–4.5)
GLUCOSE SERPL-MCNC: 86 MG/DL (ref 70–99)
HCT VFR BLD AUTO: 40 % (ref 34–46.6)
HDLC SERPL-MCNC: 68 MG/DL
HGB BLD-MCNC: 13.7 G/DL (ref 11.1–15.9)
IMM GRANULOCYTES # BLD AUTO: 0 X10E3/UL (ref 0–0.1)
IMM GRANULOCYTES NFR BLD AUTO: 0 %
IMP & REVIEW OF LAB RESULTS: NORMAL
LDLC SERPL CALC-MCNC: 105 MG/DL (ref 0–99)
LYMPHOCYTES # BLD AUTO: 1.5 X10E3/UL (ref 0.7–3.1)
LYMPHOCYTES NFR BLD AUTO: 38 %
MCH RBC QN AUTO: 31 PG (ref 26.6–33)
MCHC RBC AUTO-ENTMCNC: 34.3 G/DL (ref 31.5–35.7)
MCV RBC AUTO: 91 FL (ref 79–97)
MONOCYTES # BLD AUTO: 0.4 X10E3/UL (ref 0.1–0.9)
MONOCYTES NFR BLD AUTO: 11 %
NEUTROPHILS # BLD AUTO: 1.8 X10E3/UL (ref 1.4–7)
NEUTROPHILS NFR BLD AUTO: 47 %
PLATELET # BLD AUTO: 314 X10E3/UL (ref 150–450)
POTASSIUM SERPL-SCNC: 4.6 MMOL/L (ref 3.5–5.2)
PROT SERPL-MCNC: 7.4 G/DL (ref 6–8.5)
RBC # BLD AUTO: 4.42 X10E6/UL (ref 3.77–5.28)
SODIUM SERPL-SCNC: 139 MMOL/L (ref 134–144)
TRIGL SERPL-MCNC: 63 MG/DL (ref 0–149)
TSH SERPL DL<=0.005 MIU/L-ACNC: 3.93 UIU/ML (ref 0.45–4.5)
VLDLC SERPL CALC-MCNC: 12 MG/DL (ref 5–40)
WBC # BLD AUTO: 3.9 X10E3/UL (ref 3.4–10.8)